# Patient Record
Sex: FEMALE | Race: WHITE
[De-identification: names, ages, dates, MRNs, and addresses within clinical notes are randomized per-mention and may not be internally consistent; named-entity substitution may affect disease eponyms.]

---

## 2019-01-06 ENCOUNTER — HOSPITAL ENCOUNTER (EMERGENCY)
Dept: HOSPITAL 14 - H.ER | Age: 54
LOS: 1 days | Discharge: HOME | End: 2019-01-07
Payer: COMMERCIAL

## 2019-01-06 VITALS
SYSTOLIC BLOOD PRESSURE: 117 MMHG | TEMPERATURE: 98.7 F | RESPIRATION RATE: 16 BRPM | DIASTOLIC BLOOD PRESSURE: 70 MMHG | HEART RATE: 82 BPM | OXYGEN SATURATION: 97 %

## 2019-01-06 DIAGNOSIS — K52.9: Primary | ICD-10-CM

## 2019-01-06 PROCEDURE — 99283 EMERGENCY DEPT VISIT LOW MDM: CPT

## 2019-01-06 PROCEDURE — 80053 COMPREHEN METABOLIC PANEL: CPT

## 2019-01-06 PROCEDURE — 83690 ASSAY OF LIPASE: CPT

## 2019-01-06 PROCEDURE — 84100 ASSAY OF PHOSPHORUS: CPT

## 2019-01-06 PROCEDURE — 83605 ASSAY OF LACTIC ACID: CPT

## 2019-01-06 PROCEDURE — 96374 THER/PROPH/DIAG INJ IV PUSH: CPT

## 2019-01-06 PROCEDURE — 85025 COMPLETE CBC W/AUTO DIFF WBC: CPT

## 2019-01-06 PROCEDURE — 83735 ASSAY OF MAGNESIUM: CPT

## 2019-01-06 PROCEDURE — 87040 BLOOD CULTURE FOR BACTERIA: CPT

## 2019-01-07 LAB
ALBUMIN SERPL-MCNC: 3.6 G/DL (ref 3.5–5)
ALBUMIN/GLOB SERPL: 1.2 {RATIO} (ref 1–2.1)
ALT SERPL-CCNC: 112 U/L (ref 9–52)
AST SERPL-CCNC: 218 U/L (ref 14–36)
BASOPHILS # BLD AUTO: 0 K/UL (ref 0–0.2)
BASOPHILS NFR BLD: 0.4 % (ref 0–2)
BUN SERPL-MCNC: 22 MG/DL (ref 7–17)
CALCIUM SERPL-MCNC: 8.6 MG/DL (ref 8.4–10.2)
EOSINOPHIL # BLD AUTO: 0.1 K/UL (ref 0–0.7)
EOSINOPHIL NFR BLD: 1.4 % (ref 0–4)
ERYTHROCYTE [DISTWIDTH] IN BLOOD BY AUTOMATED COUNT: 13 % (ref 11.5–14.5)
GFR NON-AFRICAN AMERICAN: > 60
HGB BLD-MCNC: 12.9 G/DL (ref 12–16)
LIPASE SERPL-CCNC: 87 U/L (ref 23–300)
LYMPHOCYTES # BLD AUTO: 0.9 K/UL (ref 1–4.3)
LYMPHOCYTES NFR BLD AUTO: 10.3 % (ref 20–40)
MCH RBC QN AUTO: 30.8 PG (ref 27–31)
MCHC RBC AUTO-ENTMCNC: 33.4 G/DL (ref 33–37)
MCV RBC AUTO: 92.2 FL (ref 81–99)
MONOCYTES # BLD: 0.4 K/UL (ref 0–0.8)
MONOCYTES NFR BLD: 4.4 % (ref 0–10)
NEUTROPHILS # BLD: 7.3 K/UL (ref 1.8–7)
NEUTROPHILS NFR BLD AUTO: 83.5 % (ref 50–75)
NRBC BLD AUTO-RTO: 0 % (ref 0–0)
PLATELET # BLD: 221 K/UL (ref 130–400)
PMV BLD AUTO: 7.8 FL (ref 7.2–11.7)
RBC # BLD AUTO: 4.19 MIL/UL (ref 3.8–5.2)
WBC # BLD AUTO: 8.7 K/UL (ref 4.8–10.8)

## 2019-01-07 NOTE — ED PDOC
HPI: Abdomen


Time Seen by Provider: 01/06/19 22:59


Chief Complaint (Nursing): Abdominal Pain


Chief Complaint (Provider): Abdominal Pain


History Per: Patient


History/Exam Limitations: no limitations


Context: Travel


Location Of Pain/Discomfort: Epigastric


Associated Symptoms: Vomiting


Additional Complaint(s): 





52 y/o female presents to the ED complaining of abdominal pain with associated 

vomiting and diarrhea, onset x4 days ago. Patient reports that she started to 

experience episgastric discomfort x4 days ago then, x2 days ago, on Friday, she 

started to have multiple episodes of vomiting and diarrhea.Patient reports 

vomiting was nbnb and diarrhea was watery. More recently however, diarrhea is a 

little blood tinged and patient reports associated rectal irritation. Patient 

states she came back from [place] a week ago. She also reports sick contact with 

at least one colleague at work with similar symptoms. She denies fever, chills 

or urinary symptoms.





PMD: Flavio





Past Medical History


Reviewed: Historical Data, Nursing Documentation, Vital Signs


Vital Signs: 





                                Last Vital Signs











Temp  98.7 F   01/06/19 22:52


 


Pulse  82   01/06/19 22:52


 


Resp  16   01/06/19 22:52


 


BP  117/70   01/06/19 22:52


 


Pulse Ox  97   01/06/19 22:52














- Medical History


PMH: No Chronic Diseases





- Surgical History


Surgical History: Cholecystectomy





- Family History


Family History: States: No Known Family Hx





- Social History


Current smoker - smoking cessation education provided: No


Alcohol: None


Drugs: Denies





- Immunization History


Hx Tetanus Toxoid Vaccination: Yes


Hx Influenza Vaccination: No


Hx Pneumococcal Vaccination: No





- Home Medications


Home Medications: 


                                Ambulatory Orders











 Medication  Instructions  Recorded


 


Ondansetron [Zofran] 4 mg PO Q6H PRN #5 tab 01/07/19














- Allergies


Allergies/Adverse Reactions: 


                                    Allergies











Allergy/AdvReac Type Severity Reaction Status Date / Time


 


No Known Allergies Allergy   Verified 01/06/19 22:52














Review of Systems


ROS Statement: Except As Marked, All Systems Reviewed And Found Negative (as per

 HPI otherwise negative)


Constitutional: Negative for: Fever, Chills


Gastrointestinal: Positive for: Vomiting, Diarrhea


Genitourinary Female: Negative for: Dysuria, Frequency, Incontinence





Physical Exam





- Reviewed


Nursing Documentation Reviewed: Yes


Vital Signs Reviewed: Yes





- Physical Exam


Appears: Positive for: No Acute Distress


Head Exam: Positive for: ATRAUMATIC, NORMOCEPHALIC


Skin: Positive for: Warm, Dry


Eye Exam: Positive for: EOMI, PERRL


ENT: Positive for: Other (moist mucous membranes)


Neck: Positive for: Painless ROM, Supple


Cardiovascular/Chest: Positive for: Regular Rate, Rhythm.  Negative for: Murmur


Respiratory: Positive for: Normal Breath Sounds.  Negative for: Respiratory 

Distress


Gastrointestinal/Abdominal: Positive for: Soft, Tenderness (mild epigastric 

tenderness to palpation).  Negative for: Mass, Distended, Guarding, Rebound


Back: Positive for: Normal Inspection.  Negative for: Muscle Spasm


Extremity: Positive for: Normal ROM.  Negative for: Deformity


Lymphatic: Negative for: Adenopathy


Neurologic/Psych: Positive for: Alert.  Negative for: Motor/Sensory Deficits





- Laboratory Results


Result Diagrams: 


                                 01/06/19 23:50





                                 01/06/19 23:50





- ECG


O2 Sat by Pulse Oximetry: 97 (RA)


Pulse Ox Interpretation: Normal





Medical Decision Making


Medical Decision Making: 





Time:  23:20


Initial Impression: vomiting and diarrhea


Differential included but not limited to gastroenteritis, viral syndrome, 

dehydration, electrolyte abnormality, Traveler's Diarrhea.


Initial Plan:


* CMP


* Lact acid


* lipase


* Magnesium


* Phosphorous


* ED urine dip


* CBC w/ diff


* IV fluids


* Pepcid 20 mg


* Zofran 8 mg


* Blood culture


* Stool culture


---------------------------------------------





12am Endorsed to Dr Davey pending ER workup, reassessment and final ER 

disposition








------------------------------------------------------------------------

-------------------------   


Scribe Attestation:


Documented by Wesley Hernandez, acting as a scribe for Madison Dmoinguez MD.





Provider Scribe Attestation:


All medical record entries made by the Scribe were at my direction and 

personally dictated by me. I have reviewed the chart and agree that the record 

accurately reflects my personal performance of the history, physical exam, 

medical decision making, and the department course for this patient. I have also

 personally directed, reviewed, and agree with the discharge instructions and 

disposition.





Disposition





- Clinical Impression


Clinical Impression: 


 Gastroenteritis








- Disposition


Disposition: Transfer of Care


Disposition Time: 00:00


Condition: STABLE


Prescriptions: 


Ondansetron [Zofran] 4 mg PO Q6H PRN #5 tab


 PRN Reason: Nausea/Vomiting

## 2019-01-07 NOTE — ED PDOC
- Laboratory Results


Result Diagrams: 


                                 01/06/19 23:50





                                 01/06/19 23:50





- ECG


O2 Sat by Pulse Oximetry: 97 (RA)


Pulse Ox Interpretation: Normal





Medical Decision Making


Medical Decision Making: 





Time: 00:00


Patient care endorsed from Dr. Dominguez to provider pending reevaluation.





03:00


Patient able to tolerate PO. On reevaluation patient's abdomen is soft and 

nontender. Patient condition is improved and diagnosis is Gastroenteritis. 


 

--------------------------------------------------------------------------------


-----------------   


Scribe Attestation:


Documented by Wesley Hernandez, acting as a scribe for Ling Davey MD.





Provider Scribe Attestation:


All medical record entries made by the Scribe were at my direction and 

personally dictated by me. I have reviewed the chart and agree that the record 

accurately reflects my personal performance of the history, physical exam, 

medical decision making, and the department course for this patient. I have also

personally directed, reviewed, and agree with the discharge instructions and 

disposition.





Disposition





- Clinical Impression


Clinical Impression: 


 Gastroenteritis








- POA


Present On Arrival: None





- Disposition


Disposition: Routine/Home


Disposition Time: 03:00


Condition: IMPROVED


Prescriptions: 


Ondansetron [Zofran] 4 mg PO Q6H PRN #5 tab


 PRN Reason: Nausea/Vomiting